# Patient Record
Sex: MALE | Race: WHITE | Employment: OTHER | URBAN - METROPOLITAN AREA
[De-identification: names, ages, dates, MRNs, and addresses within clinical notes are randomized per-mention and may not be internally consistent; named-entity substitution may affect disease eponyms.]

---

## 2019-06-24 ENCOUNTER — TRANSFERRED RECORDS (OUTPATIENT)
Dept: HEALTH INFORMATION MANAGEMENT | Facility: CLINIC | Age: 74
End: 2019-06-24

## 2019-07-25 ENCOUNTER — TRANSFERRED RECORDS (OUTPATIENT)
Dept: HEALTH INFORMATION MANAGEMENT | Facility: CLINIC | Age: 74
End: 2019-07-25

## 2019-11-26 NOTE — TELEPHONE ENCOUNTER
ONCOLOGY INTAKE: Records Information      APPT INFORMATION:  Referring provider: NANCY DENNISON MD  Referring provider s clinic:  [facility]  Reason for visit/diagnosis: History of gastric cancer. Abdominal wall pain. Irregular bowel habbit and pancreatic cyst   Has patient been notified of appointment date and time?: Yes    RECORDS INFORMATION:  Were the records received with the referral (via [employer])? No    Has patient been seen for any external appt for this diagnosis? Yes    If yes, where? [facility]    Has patient had any imaging or procedures outside of [place] for this condition? Yes      If Yes, where? [facility]    ADDITIONAL INFORMATION:  # [phone] for [facility] records team to release info.

## 2019-11-27 NOTE — TELEPHONE ENCOUNTER
RECORDS STATUS - ALL OTHER DIAGNOSIS      RECORDS RECEIVED FROM: [facility]   DATE RECEIVED:    NOTES STATUS DETAILS   OFFICE NOTE from referring provider [facility] - Most recent OV 10/9/19: Dimitris   OFFICE NOTE from medical oncologist  8/7/19: Vegunta   DISCHARGE SUMMARY from hospital CE 3/25/19,5/31/17   DISCHARGE REPORT from the ER CE 9/4/17, 10/13/16, 5/31/14   OPERATIVE REPORT CE EGD: 7/25/19, 7/18/17, 2/2/17, 1/23/17    Laparoscopy: 8/29/17, 5/31/17    Peritoneal Washings: 2/15/17   MEDICATION LIST [facility]   CLINICAL TRIAL TREATMENTS TO DATE     LABS     PATHOLOGY REPORTS [facility] 7/25/19, 3/25/19, 11/13/18, 7/27/18, 2/14/18, 7/18/17, 5/31/17, 2/2/17, 1/23/17   ANYTHING RELATED TO DIAGNOSIS     GENONOMIC TESTING     TYPE:     IMAGING (NEED IMAGES & REPORT) Requested imaging detailed in Records Action below    CT SCANS     MRI     MAMMO     ULTRASOUND     PET       Action    Action Taken 11/27/19:    Images from [facility] requested (All reports in CE):    XR Upper GI With SM Bowel: 6/24/19  CT Abdomen Pelvis With Contrast: 6/7/19  XR Chest PA and Lateral: 3/27/19  MRI Abdomen With and Without Contrast: 1/30/19  CT Chest With Contrast: 1/25/19  CT Abdomen Pelvis Without and With Contrast: 8/7/18  CT Chest With Contrast: 7/30/18  CT Abdomen Pelvis Without and With Contrast: 3/15/18  MRI Abdomen With and Without Contrast: 2/8/18  CT Abdomen Pelvis Without and With Contrast: 1/22/18  CT Chest with Contrast: 10/29/19  XR Upper GI: 10/3/17  XR Long GI Feeding Tube Placement: 8/18/17  XR Upper GI With SM Bowel: 7/20/17  CT Abdomen Pelvis: 5/11/17  XR Chest Portable: 2/16/17  MRI Abdomen Without and With Contrast: 11/6/16  MRI Pelvis Without and With Contrast: 11/2/16  CT Chest Without Contrast: 10/26/16  HIDA: 10/18/16  CT Abdomen Pelvis With Contrast: 10/14/16  US Gallbladder RUQ: 10/14/16  XRay Video Swallow With Fluro: 6/17/16  XR Chest PA And Lateral: 5/25/16

## 2019-12-18 ENCOUNTER — OFFICE VISIT (OUTPATIENT)
Dept: GASTROENTEROLOGY | Facility: CLINIC | Age: 74
End: 2019-12-18
Payer: MEDICARE

## 2019-12-18 ENCOUNTER — TELEPHONE (OUTPATIENT)
Dept: GASTROENTEROLOGY | Facility: CLINIC | Age: 74
End: 2019-12-18

## 2019-12-18 VITALS
DIASTOLIC BLOOD PRESSURE: 66 MMHG | WEIGHT: 160.2 LBS | TEMPERATURE: 97.2 F | SYSTOLIC BLOOD PRESSURE: 125 MMHG | OXYGEN SATURATION: 96 % | RESPIRATION RATE: 20 BRPM | HEART RATE: 82 BPM

## 2019-12-18 DIAGNOSIS — R10.13 ABDOMINAL PAIN, EPIGASTRIC: Primary | ICD-10-CM

## 2019-12-18 DIAGNOSIS — R68.81 EARLY SATIETY: Primary | ICD-10-CM

## 2019-12-18 DIAGNOSIS — R10.13 ABDOMINAL PAIN, EPIGASTRIC: ICD-10-CM

## 2019-12-18 DIAGNOSIS — R68.81 EARLY SATIETY: ICD-10-CM

## 2019-12-18 PROCEDURE — 99204 OFFICE O/P NEW MOD 45 MIN: CPT | Performed by: INTERNAL MEDICINE

## 2019-12-18 RX ORDER — TRIAMCINOLONE ACETONIDE 1 MG/G
CREAM TOPICAL
COMMUNITY
Start: 2017-12-13

## 2019-12-18 RX ORDER — SUCRALFATE ORAL 1 G/10ML
1 SUSPENSION ORAL 4 TIMES DAILY
Qty: 300 ML | Refills: 3 | Status: SHIPPED | OUTPATIENT
Start: 2019-12-18

## 2019-12-18 RX ORDER — TAMSULOSIN HYDROCHLORIDE 0.4 MG/1
0.4 CAPSULE ORAL DAILY
COMMUNITY

## 2019-12-18 RX ORDER — FINASTERIDE 5 MG/1
5 TABLET, FILM COATED ORAL DAILY
COMMUNITY
Start: 2018-10-19

## 2019-12-18 RX ORDER — SUCRALFATE 1 G/1
1 TABLET ORAL 4 TIMES DAILY
Qty: 120 TABLET | Refills: 3 | Status: SHIPPED | OUTPATIENT
Start: 2019-12-18

## 2019-12-18 ASSESSMENT — PAIN SCALES - GENERAL: PAINLEVEL: MILD PAIN (2)

## 2019-12-18 NOTE — NURSING NOTE
Luciano Mcdonnell's goals for this visit include:   Chief Complaint   Patient presents with     RECHECK     History of gastric cancer       He requests these members of his care team be copied on today's visit information: Yes    PCP: Jason Gama    Referring Provider:  No referring provider defined for this encounter.    /66 (BP Location: Left arm, Patient Position: Sitting, Cuff Size: Adult Large)   Pulse 82   Temp 97.2  F (36.2  C) (Oral)   Resp 20   Wt 72.7 kg (160 lb 3.2 oz)   SpO2 96%     Do you need any medication refills at today's visit? Deya Daniel CMA

## 2019-12-18 NOTE — TELEPHONE ENCOUNTER
Spoke to pharmacy, carafate tablets are covered. Orders placed.    Brigid Salcido RN  Gastroenterology Care Coordinator  [place], [place]

## 2019-12-18 NOTE — TELEPHONE ENCOUNTER
Health Call Center    Phone Message    May a detailed message be left on voicemail: yes    Reason for Call: Medication Question or concern regarding medication   Prescription Clarification  Name of Medication: sucralfate (CARAFATE) 1 GM/10ML suspension  Prescribing Provider: Dr. Hutchison   Pharmacy: [facility]    What on the order needs clarification? The Rx is not covered by his insurance and they are wondering if they can use the tablets instead since it will be covered and cheaper for the patient. Please advise. Thank you.          Action Taken: Message routed to:  Adult Clinics: Gastroenterology (GI) p [id number]

## 2019-12-18 NOTE — PROGRESS NOTES
"GI CLINIC VISIT    CC/REFERRING MD:  No ref. provider found  REASON FOR CONSULTATION:   No ref. provider found for   Chief Complaint   Patient presents with     RECHECK     History of gastric cancer         HPI    Patient here today for a second opinion regarding abdominal pain. Has a history of gastric cancer that was treated with a bilroth II resection after receiving neoadjuvant chemotherapy.   Initially he had a lot of symptoms of nausea, early satiety, etc but those slowly resolved over time and he was doing relatively well until he had to have a L nephrectomy for a renal mass in March.  Since March he has had a few issues.  One is a sharp pain that occurs just next to his surgical scar.  Pain happens when anything touches the skin and he has been wearing looser clothes to combat this.  Was seen by pain clinic and had a local block done which helped a little but his symptoms did not resolve completely.  No association with eating or bowel movements.    His other issue is nausea that occurs about 30 minutes after eating.  Worse in the morning and interestingly is much worse when he exerts himself.  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  He will then go eat lunch and often lay down after so his symptoms improve.  He then generally eats dinner without issue and then he eats \"as much as possible\" after dinner until he goes to bed or else he will wake up in the middle of the night hungry.  Tolerates liquids without any problems.  No dysphagia.  Does notice that his symptoms are much worse with fatty foods but admits its often hard to avoid them. Italian foods are easy for him to tolerate like pasta with red sauce.  No reflux.  Has been following with GI in [place] regarding his symptoms - had an upper GI series which was unremarkable.  EGD with mild inflammation at the anastomosis but otherwise normal.  Tried a PPI but it gave him diarrhea.    He alternates " between loose and formed stools.  Was having constipation at one point but that seems to be better now that he eats cashews at night.    Says that his symptoms are bothersome but manageable although he is hoping that he will be able to get some relief.      ROS:     No fevers or chills  No weight loss  No blurry vision, double vision or change in vision  No sore throat  No lymphadenopathy  No headache, paraesthesias, or weakness in a limb  No shortness of breath or wheezing  No chest pain or pressure  No arthralgias or myalgias  No rashes or skin changes  No odynophagia or dysphagia  No BRBPR, hematochezia, melena  No dysuria, frequency or urgency  No hot/cold intolerance or polyria  No anxiety or depression    PROBLEM LIST  History of nephrectomy  History of gastric adenoCA  Nausea  Abdominal pain  DM     PREVIOUS SURGERIES:  Bilroth II  nephrectomy    PREVIOUS ENDOSCOPY:  As above    ALLERGIES:   No Known Allergies    PERTINENT MEDICATIONS:    Current Outpatient Medications:      finasteride (PROSCAR) 5 MG tablet, Take 5 mg by mouth, Disp: , Rfl:      multivitamin CF FORMULA (CHOICEFUL) softgel, Take 1 tablet by mouth daily, Disp: , Rfl:      tamsulosin (FLOMAX) 0.4 MG capsule, Take 0.4 mg by mouth, Disp: , Rfl:      triamcinolone (KENALOG) 0.1 % external cream, Apply to affected area 3 times a day as needed for itching, Disp: , Rfl:      UNABLE TO FIND, MEDICATION NAME: Simethicone 0.4mg capsule pt states taking this OTC every day, Disp: , Rfl:     SOCIAL HISTORY:  Social History     Socioeconomic History     Marital status:      Spouse name: Not on file     Number of children: Not on file     Years of education: Not on file     Highest education level: Not on file   Occupational History     Not on file   Social Needs     Financial resource strain: Not on file     Food insecurity:     Worry: Not on file     Inability: Not on file     Transportation needs:     Medical: Not on file     Non-medical: Not on  file   Tobacco Use     Smoking status: Never Smoker     Smokeless tobacco: Never Used   Substance and Sexual Activity     Alcohol use: Not on file     Drug use: Not on file     Sexual activity: Not on file   Lifestyle     Physical activity:     Days per week: Not on file     Minutes per session: Not on file     Stress: Not on file   Relationships     Social connections:     Talks on phone: Not on file     Gets together: Not on file     Attends [employer] service: Not on file     Active member of club or organization: Not on file     Attends meetings of clubs or organizations: Not on file     Relationship status: Not on file     Intimate partner violence:     Fear of current or ex partner: Not on file     Emotionally abused: Not on file     Physically abused: Not on file     Forced sexual activity: Not on file   Other Topics Concern     Not on file   Social History Narrative     Not on file       FAMILY HISTORY:  Father with heart disease  Mother with unknown cancer  Past/family/social history reviewed and no changes    PHYSICAL EXAMINATION:  Constitutional: aaox3, cooperative, pleasant, not dyspneic/diaphoretic, no acute distress  Vitals reviewed: /66 (BP Location: Left arm, Patient Position: Sitting, Cuff Size: Adult Large)   Pulse 82   Temp 97.2  F (36.2  C) (Oral)   Resp 20   Wt 72.7 kg (160 lb 3.2 oz)   SpO2 96%   Wt:   Wt Readings from Last 2 Encounters:   12/18/19 72.7 kg (160 lb 3.2 oz)      Eyes: Sclera anicteric/injected  Ears/nose/mouth/throat: Normal oropharynx without ulcers or exudate, mucus membranes moist, hearing intact  Neck: supple, thyroid normal size  CV: No edema  Respiratory: Unlabored breathing  Lymph: No axillary, submandibular, supraclavicular or inguinal lymphadenopathy  Abd: Soft, Nondistended, +bs, no hepatosplenomegaly,, no peritoneal signs.  Surgical scars noted.  Exquisitely tender to palpation adjacent to LLQ scar  Skin: warm, perfused, no jaundice  Psych: Normal  affect  MSK: Normal gait      PERTINENT STUDIES:  Most recent CBC:  No lab results found.  Most recent hepatic panel:  No lab results found.    Invalid input(s): WILLIAM, ALP  Most recent creatinine:  No lab results found.      ASSESSMENT/PLAN:     1. Post prandial nausea, bloating - interestingly, this is worse with activity.  Reviewed previous w/u - no evidence of anastomotic stricture. Could be bile acid gastritis.  Other etiologies include altered gastric motility 2/2 his previous surgeries.  Will get gastric emptying scan.  Can try carafate - if no improvement can consider trial of ursodiol. In the interim - dietary changes like eating small frequent meals throughout the day maybe beneficial - may also be useful to see a nutritionist at some point.    2. LLQ abdominal pain - likely neuropathic component - some improvement with injection - maybe useful to repeat.  Can also consider adding a neuromodulator such as amitriptyline.    RTC 6 months - patient has contact information for office so we can make changes to his regimen by phone if needed as he lives in [place]    Thank you for this consultation.  It was a pleasure to participate in the care of this patient; please contact us with any further questions.  A total of 30 minutes, face to face, was spent with this patient, >50% of which was counseling regarding the above delineated issues.    This note was created with voice recognition software, and while reviewed for accuracy, typos may remain.

## 2019-12-18 NOTE — PATIENT INSTRUCTIONS
Please have a gastric emptying scan done - let me know the day after you have it so I can look for it in the chart.  Start taking carafate (sucralfate) three times a day with meals - this needs to be  from other medications by at least an hour.  You may benefit from a medication that works on neuropathic pain like amitriptyline.     Try to eat smaller meals more frequently throughout the day.  Continue to avoid foods that make your symptoms worse like high fiber things and fattier foods.      Please call us with an update after you start carafate - you can reach our nurse Brigid directly at [phone] - this is also who you should call when your gastric emptying study is completed.

## 2019-12-20 ENCOUNTER — PRE VISIT (OUTPATIENT)
Dept: SURGERY | Facility: CLINIC | Age: 74
End: 2019-12-20

## 2019-12-27 ENCOUNTER — TRANSFERRED RECORDS (OUTPATIENT)
Dept: HEALTH INFORMATION MANAGEMENT | Facility: CLINIC | Age: 74
End: 2019-12-27

## 2020-01-08 ENCOUNTER — TELEPHONE (OUTPATIENT)
Dept: GASTROENTEROLOGY | Facility: CLINIC | Age: 75
End: 2020-01-08

## 2020-01-08 NOTE — TELEPHONE ENCOUNTER
Per Dr. Ortiz, pt has dumping syndrome.   Advice as follows: Pt needs to eat small frequent meals, high protein, not eat liquids with solids, not a lot of simple sugars, and have high fiber foods. See Dietician. How would patient like this arranged?    Brigid Salcido, RN  Gastroenterology Care Coordinator  [place], [place]

## 2020-01-08 NOTE — TELEPHONE ENCOUNTER
Informed pt that we received gastric emptying scan results and gave them to Dr. Ortiz.    Brigid Salcido, RN  Gastroenterology Care Coordinator  [place], [place]

## 2020-01-08 NOTE — TELEPHONE ENCOUNTER
Requested pt for records be faxed.    Brigid Salcido RN  Gastroenterology Care Coordinator  [place], [place]

## 2020-01-08 NOTE — TELEPHONE ENCOUNTER
Pt had gastric emptying scan completed at [facility] and is asking if we have received results.    Brigid Salcido RN  Gastroenterology Care Coordinator  [place], [place]

## 2020-01-10 NOTE — TELEPHONE ENCOUNTER
Relayed result notes and recommendations to patient per Dr. Hutchison. Assisted in setting appointment up with Nutrition at then end of March. Patient was agreeable and had no further questions.    Clary Burgos LPN

## 2020-06-19 ENCOUNTER — VIRTUAL VISIT (OUTPATIENT)
Dept: GASTROENTEROLOGY | Facility: CLINIC | Age: 75
End: 2020-06-19
Payer: MEDICARE

## 2020-06-19 DIAGNOSIS — K21.9 GASTROESOPHAGEAL REFLUX DISEASE, ESOPHAGITIS PRESENCE NOT SPECIFIED: Primary | ICD-10-CM

## 2020-06-19 PROCEDURE — 99442 ZZC PHYSICIAN TELEPHONE EVALUATION 11-20 MIN: CPT | Performed by: INTERNAL MEDICINE

## 2020-06-19 NOTE — PROGRESS NOTES
"Luciano Mcdonnell is a 74 year old male who is being evaluated via a billable telephone visit.      The patient has been notified of following:     \"This telephone visit will be conducted via a call between you and your physician/provider. We have found that certain health care needs can be provided without the need for a physical exam.  This service lets us provide the care you need with a short phone conversation.  If a prescription is necessary we can send it directly to your pharmacy.  If lab work is needed we can place an order for that and you can then stop by our lab to have the test done at a later time.    Telephone visits are billed at different rates depending on your insurance coverage. During this emergency period, for some insurers they may be billed the same as an in-person visit.  Please reach out to your insurance provider with any questions.    If during the course of the call the physician/provider feels a telephone visit is not appropriate, you will not be charged for this service.\"    Patient has given verbal consent for Telephone visit?  Yes    What phone number would you like to be contacted at? [phone]    How would you like to obtain your AVS? Mail a copy         Follow-up today of abdominal pain and dumping syndrome.  Abdominal pain has resolved.  Continues to have issues with not being able to eat and then exert himself - causes him to feel lousy and get a side ache.  Gastric emptying scan was notable for dumping syndrome.  Did advise patient to eat high protein/fiber meals and snacks and avoid eating and drinking at the same time.  Patient admits he hasn't tried this, although doesn't think it will help as sometimes even eating eggs will cause him to have symptoms.    Hasn't been able to see the nutritionist due to covid - planning to see them in a few weeks.     Is complaining of an intermittent burning sensation in his chest - resolves with drinking water and tums but sometimes can " last a few days.  Says this has been going on for some time, although, he had forgotten about it as he has been having the other symptoms but now that they are better he has been noticing it more.  PROBLEM LIST  History of nephrectomy  History of gastric adenoCA  Nausea  Abdominal pain  DM      PREVIOUS SURGERIES:  Bilroth II  nephrectomy    Social History     Tobacco Use     Smoking status: Never Smoker     Smokeless tobacco: Never Used   Substance Use Topics     Alcohol use: Not on file     Assessment and Plan:    # dumping syndrome - discussed with patient and his wife at length - also discussed that treatment of this is primarily dietary. Again discussed eating small frequent meals and snacks throughout the day that are low in sugar and high in fiber/protein.  Patient voiced understanding and is scheduled to see nutritionist soon    # heartburn symptoms - will try prevacid daily.  Recent EGD (when patient reports he was still having these symptoms) unremarkable.    Phone call duration: 14 minutes    Marichuy Hutchison DO

## 2020-06-19 NOTE — PATIENT INSTRUCTIONS
Please see the nutritionist.    Start taking lansoprazole (prevacid) daily - take on an empty stomach and eat 30-60 minutes later.

## 2020-06-24 ENCOUNTER — TELEPHONE (OUTPATIENT)
Dept: GASTROENTEROLOGY | Facility: CLINIC | Age: 75
End: 2020-06-24

## 2020-06-24 DIAGNOSIS — K21.9 GASTROESOPHAGEAL REFLUX DISEASE, ESOPHAGITIS PRESENCE NOT SPECIFIED: Primary | ICD-10-CM

## 2020-06-24 RX ORDER — MECOBALAMIN 5000 MCG
15 TABLET,DISINTEGRATING ORAL DAILY
Qty: 30 CAPSULE | Refills: 3 | Status: SHIPPED | OUTPATIENT
Start: 2020-06-24

## 2020-06-24 NOTE — TELEPHONE ENCOUNTER
Pharmacy called and stated the requested a prescription yesterday and haven't heard back.    Original Rx that was sent and not covered by insurance:  LANsoprazole (PREVACID SOLUTAB) 15 MG ODT    Requesting new RX: Lansoprazole 15 MG Oral Capsules

## 2020-06-30 ENCOUNTER — VIRTUAL VISIT (OUTPATIENT)
Dept: NUTRITION | Facility: CLINIC | Age: 75
End: 2020-06-30
Payer: MEDICARE

## 2020-06-30 DIAGNOSIS — R68.81 EARLY SATIETY: Primary | ICD-10-CM

## 2020-06-30 PROCEDURE — 97802 MEDICAL NUTRITION INDIV IN: CPT | Mod: 95 | Performed by: DIETITIAN, REGISTERED

## 2020-06-30 NOTE — PROGRESS NOTES
"Luciano Mcdonnell is a 74 year old male who is being evaluated via a billable telephone visit.      The patient has been notified of following:     \"This telephone visit will be conducted via a call between you and your physician/provider. We have found that certain health care needs can be provided without the need for a physical exam.  This service lets us provide the care you need with a short phone conversation.  If a prescription is necessary we can send it directly to your pharmacy.  If lab work is needed we can place an order for that and you can then stop by our lab to have the test done at a later time.    Telephone visits are billed at different rates depending on your insurance coverage. During this emergency period, for some insurers they may be billed the same as an in-person visit.  Please reach out to your insurance provider with any questions.    If during the course of the call the physician/provider feels a telephone visit is not appropriate, you will not be charged for this service.\"    Patient has given verbal consent for Telephone visit?  Yes, wife jian    What phone number would you like to be contacted at? [phone]  How would you like to obtain your AVS? 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   He is accompanied by spouse.     NUTRITION ASSESSMENT:   Patient comments/concerns relating to nutrition: Initial GI consult with Diana - Note forwarded from 12/18/2019.    Patient here today  regarding abdominal pain. Has a history of gastric cancer that was treated with a bilroth II resection after receiving neoadjuvant chemotherapy.   Initially he had a lot of symptoms of nausea, early " "satiety, etc but those slowly resolved over time and he was doing relatively well until he had to have a L nephrectomy for a renal mass in March.  Since March he has had a few issues.  One is a sharp pain that occurs just next to his surgical scar.  Pain happens when anything touches the skin and he has been wearing looser clothes to combat this.  Was seen by pain clinic and had a local block done which helped a little but his symptoms did not resolve completely.  No association with eating or bowel movements.     His other issue is nausea that occurs about 30 minutes after eating.  Worse in the morning and interestingly is much worse when he exerts himself.  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  He will then go eat lunch and often lay down after so his symptoms improve.  He then generally eats dinner without issue and then he eats \"as much as possible\" after dinner until he goes to bed or else he will wake up in the middle of the night hungry.  Tolerates liquids without any problems.  No dysphagia.  Does notice that his symptoms are much worse with fatty foods but admits its often hard to avoid them. Italian foods are easy for him to tolerate like pasta with red sauce.  No reflux.  Has been following with GI in [place] regarding his symptoms - had an upper GI series which was unremarkable.  EGD with mild inflammation at the anastomosis but otherwise normal.  Tried a PPI but it gave him diarrhea.     He alternates between loose and formed stools.  Was having constipation at one point but that seems to be better now that he eats cashews at night.     Says that his symptoms are bothersome but manageable although he is hoping that he will be able to get some relief.     Per follow up with [facility] note on 6/19/2020  Follow-up today of abdominal pain and dumping syndrome.  Abdominal pain has resolved.  Continues to have issues with not being able to eat and " then exert himself - causes him to feel lousy and get a side ache.  Gastric emptying scan was notable for dumping syndrome.  Did advise patient to eat high protein/fiber meals and snacks and avoid eating and drinking at the same time.  Patient admits he hasn't tried this, although doesn't think it will help as sometimes even eating eggs will cause him to have symptoms.     Hasn't been able to see the nutritionist due to covid - planning to see them in a few weeks.      Is complaining of an intermittent burning sensation in his chest - resolves with drinking water and tums but sometimes can last a few days.  Says this has been going on for some time, although, he had forgotten about it as he has been having the other symptoms but now that they are better he has been noticing it more.      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 Can't have a lot       Skips meals/snacks: Yes      Beverages: Yes milk 1/4 cup or juice - food gets stuck  Can't eat watermelon  Beer on occasion and seems to not cause him issus that he knows of    Waits to eat for half hour to hour sometimes needs to wait 2 hours     Barriers around meals/snacks include:GI issues      Diet is high in: refined carbs and only one big meal at a day  Diet is low in: fat  (unsaturated), fiber, fruits, protein, vegetables and consistent small meals daily   No family history on file.       Pertinent Past Medical History:   I have reviewed this patient's medical history and updated it with pertinent information if needed.   No past medical history on file.      Previous Surgeries:   I have reviewed this patient's surgical history and updated it with pertinent information if needed.  No past surgical history on file.    Physical Findings:   Digestive System:stomach or abdominal pains, bloating , heartburn/acid reflux or indigestion  and nausea     Normal Bowl Movements: No    # of Bowl movements: 1x per day   Irregular Bowl Movements: hard to pass and formed and soft     Medications and Supplements:  Current Outpatient Medications   Medication     finasteride (PROSCAR) 5 MG tablet     LANsoprazole (PREVACID) 15 MG DR capsule     multivitamin CF FORMULA (CHOICEFUL) softgel     sucralfate (CARAFATE) 1 GM tablet     sucralfate (CARAFATE) 1 GM/10ML suspension     tamsulosin (FLOMAX) 0.4 MG capsule     triamcinolone (KENALOG) 0.1 % external cream     UNABLE TO FIND     No current facility-administered medications for this visit.        LABS:  Last Basic Metabolic Panel:  No results found for: NA   No results found for: POTASSIUM  No results found for: CHLORIDE  No results found for: MALGORZATA  No results found for: CO2  No results found for: BUN  No results found for: CR  No results found for: GLC    Last Glucose Profile:   No results found for: A1C    Last Lipid Profile:   No results found for: CHOL  No results found for: HDL  No results found for: LDL  No results found for: TRIG  No results found for: CHOLHDLRATIO    Most recent CBC:  No lab results found.  Most recent hepatic panel:  No lab results found.    Invalid input(s): WILLIAM, ALP  Most recent creatinine:  No lab results found.    Last Thyroid Profile:   No results found for: TSH    Last Mineral Profile:   No results found for: JOHANNA, IRON,  "FEB      Last Vitamin Profile:   No results found for: BHG391, SPAI134, [id number], VITD3, D2VIT, D3VIT, DTOT, [id number], [id number], [id number], [id number], [id number], [id number]    ANTHROPOMETRICS:  Vitals:   BP Readings from Last 1 Encounters:   12/18/19 125/66     Pulse Readings from Last 1 Encounters:   12/18/19 82     There is no height or weight on file to calculate BMI.    Wt Readings from Last 5 Encounters:   12/18/19 72.7 kg (160 lb 3.2 oz)         NUTRITION DIAGNOSIS:   1. Altered GI function related to skipping meals, eating one big meal and higher intake of refined carbs as evidenced by food recall, stomach pain, bloating. (high fasting glucose and low hemoglobin)         NUTRITION INTERVENTION:      Long Term Goals:   Goal: BM Daily Dahlgren Stool type 4 and decrease digestive issues    Goal: Gain 10lbs in the next 1-3 months and stop losing weight     Short Term Goals:   Goal 1:   Start smoothie for breakfast daily - see recipes - try unsweetened almond milk instead of cows milk, pick plant based protein powder as discussed below        Goal 2:  Try the probiotic BIOHM - see more info under probiotics below     Goal 3: At follow up will consider a digestive enzyme if dietary and probiotic have not significantly helped with symptoms.       Nutrition for Dumping Syndrome  Dumping syndrome is a condition that occurs when food is emptied or \"dumped\" from  the stomach into the small intestine too quickly.     There are two phases for symptoms of dumping syndrome: early dumping and late  dumping:    Early dumping may occur 10-30 minutes right after a meal, symptoms may  include nausea, vomiting, diarrhea, gas, bloating and cramping.    Late dumping may occur 1-3 hours after a meal, symptoms may include  hypoglycemia (low blood sugars), dizziness, lightheadedness and sweating.  Making changes in the diet can help slow gastric emptying and minimize symptoms.  General Guidelines    Eat 5-6 small frequent " meals throughout the day. Eat slowly and chew food well.    People with dumping syndrome should reduce the amount of food consumed at each meal in order to minimize symptoms. That means eating about 5 to 6 smaller meals throughout the day instead of eating three larger meals. In addition to eating smaller meals, dumping syndrome patients should eat slowly, chew their food well and delay drinking fluids until at least 30 minutes after a meal. Even though you should wait until after a meal is finished to drink water, make sure you do it so that you don t become dehydrated. Some people also find it helpful to lie down after eating, which may help to relieve symptoms.      Avoid drinking liquids with meals. Drink liquids 30 minutes before a meal or drink  30 minutes after a meal.      Include small amounts of extra fats into the diet such as avocado, nuts and nut  butters.    Include protein with each meal and snack you eat. (High protein foods include  meats, poultry, fish, eggs, tofu and nut butters- monitor to see if sensitive to trigger protein foods taken out of elimination diet such as eggs, beef, pork and dairy)    Rest or lie down for 30 minutes after a meal       Avoid inflammatory & high sugar foods. Dairy tops the list. The proteins like casein and whey in dairy can irritate and inflame your gut, while gluten the protein in wheat, rye and barley is a close second. Give those foods up for at least 3-6 months and see if digestion, blood sugars, weight and overall health improve.    The lactose in dairy products may worsen dumping syndrome symptoms, so limiting dairy consumption may be helpful. If you eat dairy products, start by limiting these foods to see if symptoms improve. If you think that dairy may be causing symptoms to worsen, eliminate dairy foods completely. For people with lactose intolerance, goat milk may be easier to digest because it contains lower concentrations of lactose.    Continue to  eliminate gluten, dairy, soy, corn, processed refined grains, sugar, alcohol and caffeine  Monitor FODMAPS and if nightshades contribute to your symptoms. Top common nightshades are white potatoes, tomatoes, egg plant, bell peppers and hot peppers. Also, monitor foods high in histamine and fermented foods to see if trigger symptoms - see Elimination Food Plan     Eat real anti-inflammatory food. When you eat whole, real foods in their unprocessed forms, you take the first step to healing our gut and overall health. Eat plenty of vegetables, fruits, non-gluten whole grains (monitor for symptoms), nuts, seeds, and other plant foods.     Start following reintroduction phase (see guide for details) of elimination diet to see if certain foods trigger symptoms. If you are avoiding FODMAPS focus on keeping out wheat and gluten and then add in cashew butter or nuts to see if this high fodmap food gives you issues. The same can be done for nightshades. Tracking your foods and symptoms can be beneficial in helping you identify patterns while becoming more aware of what you eat.       Food plan - 1,800-2,200 calories per day     Protein 10-12 servings per day - include at each meal to stabilize blood sugars   (Choose 3oz or 21g per meal and aim for 1oz of 7g for snacks)   Strive for 1-2 servings of fish per week especially of higher omega-3 fatty acid containing fish such as salmon.     Legumes <2 serving per day     Dairy alternatives 2-3 serving per day     Nuts and seeds 3-4 servings per day - great to incorporate as snacks    Fats and oils 4 servings per day     Non starchy vegetables 8-10 servings per day     Starchy vegetable limit 1 serving per day as they tend to impact blood sugar (they are moderate-GI).     Fruits 2 servings per day - best to couple with a little bit of protein or fat to offset a rise in blood sugars (they are low-moderate-GI foods).     Whole grains <2 serving per day - try gluten free whole grains  instead    Eat High-Fiber and Protein-Rich Foods    Eating complex carbohydrates that are high in fiber can help people with dumping syndrome. This is because they resist immediate breakdown and convert into sugar over time, as opposed to refined carbohydrates that have had their structure altered and enter the bloodstream like an injection of sugar. Complex carbohydrate foods that you can be consume regularly include root vegetables (like sweet potatoes, carrots, parsnips and beets), legumes, sprouted grain bread, and ancient grains like barley and quinoa. Try focusing on gluten free grains for at least 3 weeks to see if this helps with symptoms.     Eating protein foods is also important because they require more work for the body to digest than fast-acting refined carbohydrates. Eating clean, healthy sources of protein will also help to stabilize your blood sugar levels. They also provide helpful digestive enzymes and immune-boosting antibodies. The best sources of protein are grass-fed beef, lentils, wild fish, organic chicken, free-range eggs and protein powder made from bone broth.    Incorporate protein powder daily:    Plant based hemp (recommended brands: "[employer] Just Hemp Protein, [employer])      Plant based pea (recommended brands: Naked Pea, [employer]). If you want to try a combo of pea and hemp the brand Nelson in vanilla or chocolate is a great option.     Try Bone broth protein powder or collagen peptides in liquid bone broth, vegetable broth or 12 oz of water as snack. The bone broth powder and collagen can be used for soups as well. This can help provide essential amino acids and minerals that heal your gut as well as balance blood sugars. A great option if you have a hard time tolerating solids.         Eat Fresh Fruits and Vegetables    Eat plenty of fresh fruits and vegetables in order to ensure that you re consuming (and absorbing) enough nutrients throughout the day.  Colorful fruits like apples, pears, megan, guava, pineapple and berries, and cruciferous vegetables (like arugula, bok cady, broccoli, kale and kristi greens) are some of the best choices. If you choose to make a healthy smoothie with fruits and vegetables instead of eating them whole with your meals, wait an hour after eating. This allows your body to properly digest the solid foods first.      Choose foods high in fiber: Aim for at least 5 grams of fiber per serving of food or a total of 25-35 grams fiber per day. Remember, when looking at the label, you can take the fiber away from the total carbs. Ex:15g of total carbs - 4g of fiber = 11g net carbs     Insoluble fiber acts like a bulky  inner broom,  sweeping out debris from the intestine and creating more motility and movement.      Soluble fiber attracts water and swells, creating a gel that slows digestion.  Also, slows the release of glucose from foods into the blood which stops spikes in blood sugar levels.  Soluble fiber traps toxins in the gut, helping to carry them to excretion and provides healthy bacteria in the digestive tract.       Spread out fiber intake throughout the day. (Fiber is a component of complex carbohydrates that the body cannot digest, is found in plant based foods such as beans, lentils,  fruits, vegetables and whole grains)      Limit Simple Carbohydrates    It s important for people with dumping syndrome to eliminate simple, rapidly absorbable carbohydrates from their diet in order to prevent late dumping symptoms like hypoglycemia. Refined carbohydrates are forms of sugars and starches that don t actually exist in nature. This means they don t come from natural, whole foods. Instead they are processed, concentrated and purified in order to change the food s taste, texture and shelf life.    Avoid eating baked goods, refined grains, wheat products, fruit juices, soda and foods made with table sugar. It s also important to avoid  eating packaged and processed foods that contain simple carbohydrates, added sugars and chemical additives that can disrupt the GI tract even further and make dumping symptoms worse.    Avoid foods containing refined sugars, artificial sweeteners, and refined grains they are considered high-GI because they lead to sharp increases in blood sugars levels, which increase insulin sensitivity causing increased TG, and low good cholesterol (HDL).   Ex: cakes, cookies, pies, bread, sodas, fruit drinks, presweetened tea, coffee drinks, energy or sport drinks, flavored milk and other processed foods.     Choose High Quality Fats: Adding anti-inflammatory fats into your diet such as fish (salmon, herring, mackerel, and sardines), omega 3 eggs, leidy seeds, ground flax seeds/milk, hemp seeds/milk, walnuts and some other certain leafy greens will increase omega-3 fats to omeaga-6 fats ratio.     Therapeutic fats both monounsaturated and polyunsaturated to include daily: ground flaxseeds, unsalted mixed nuts, avocados, olives, extra-virgin olive oil.     Emphasize high-quality oils and fats in the diet daily such as avocado oil, coconut oil, flaxseed, olive, sesame. Ex: 1 tsp to 1 tbsp of MCT oil from coconuts can be added into coffee, smoothies, and salad dressings per day.     Avoid trans fats found in processed foods     Drink more water. Hydration is critical, so drink at least six to eight glasses of water a day. Drink more water between meals and less at meals.     Try adding herbal teas (sugar free) or lemon/lime/cucumber/fruit to water for flavor. Avoid artificial sweetener packets to flavor your water.     Cut back on coffee switch to green tea. Avoid adding sugar and milk to coffee instead use dairy alternatives such as almond, flax, coconut milk.     REPOPULATE  Recolonization with healthy, beneficial bacteria (probiotics) and reintroduce prebiotics from foods/supplements as tolerated that will help rebalance the  microbiome.     Probiotics: Consume foods rich in beneficial bacteria such as coconut kefir, coconut yogurt, sauerkraut, kimchi, kombucha or [employer].       Prebiotics: Incorporate carbohydrates that bacteria love to eat such as inulin from chicory, onions, garlic, leeks, artichokes, dandelion leaves, zack gum, asparagus, apples, leidy/flax seeds, psyllium, beans and resistant starches (seeds, cashews, legumes, plantains, green bananas and potatoes that are cooked and cooled.    **avoid any foods that cause adverse reactions and not tolerated when you reintroduce. Introduce slowly small amounts of gas and bloating is normal and should pass.       Rebuild your friendly bacteria in your microbime. Start taking probiotic supplements. They will help rebuild the healthy bacteria so essential to good gut health.     Recommend trying BIOHM probiotic.  Take 1 capsule daily anytime with our without food. Does not need to be refrigerated. Get at www.[employer]. Try take at least for one month. This isn t something you have to take long term for years and years. Just getting some good bacteria in and then eating the right foods feeds them so they can multiply.     Track what you eat. Writing down or tracking through an ke what you eat as well as how you feel and help you identify patterns in your symptoms. This can help you become more aware and create a diet that is right for you. You can track symptoms, bowl movements, medications, stress, exercise, sleep and foods as well as beverages to become more mindful.     ** Make list of foods that cause symptoms to provide and review at follow up.     Increase physical activity. Moving our body helps move our bowels and speeds up your metabolism.     Exercise 15-60 minutes daily--whether that looks like burst training, yoga, or vigorous walking.      NUTRITION RESOURCES:  IFM Elimination Diet - Recipes, guide, meal plan    PATIENT'S BEHAVIOR CHANGE GOALS:   See nutrition  intervention for patient stated behavior change goals. AVS was printed and given to patient at today's appointment.    MONITOR / EVALUATE:  Registered Dietitian will monitor/evaluate the following:     Beliefs and attitudes related to food    Food and nutrition knowledge / skills    Food / Beverage / Nutrient intake     Pertinent Labs    Progress toward meeting stated nutrition-related goals    Readiness to change nutrition-related behaviors    Weight change    Digestion     COORDINATION OF CARE:  Recommend additional testing B12, zinc and mg would be beneficial after seeing how he does with dietary changes       FOLLOW-UP:  Follow-up appointment scheduled in August virtual.       Time spent in minutes: 75 minutes   Encounter: Individual    Deanna Hodgson RD, CLT, LD  Integrative Registered Dietitian

## 2020-07-01 NOTE — PATIENT INSTRUCTIONS
"NUTRITION INTERVENTION:      Long Term Goals:   Goal: BM Daily [place] Stool type 4 and decrease digestive issues    Goal: Gain 10lbs in the next 1-3 months and stop losing weight     Short Term Goals:   Goal 1:   Start smoothie for breakfast daily - see recipes - try unsweetened almond milk instead of cows milk, pick plant based protein powder as discussed below        Goal 2:  Try the probiotic BIOHM - see more info under probiotics below     Goal 3: At follow up will consider a digestive enzyme if dietary and probiotic have not significantly helped with symptoms.       Nutrition for Dumping Syndrome  Dumping syndrome is a condition that occurs when food is emptied or \"dumped\" from  the stomach into the small intestine too quickly.     There are two phases for symptoms of dumping syndrome: early dumping and late  dumping:    Early dumping may occur 10-30 minutes right after a meal, symptoms may  include nausea, vomiting, diarrhea, gas, bloating and cramping.    Late dumping may occur 1-3 hours after a meal, symptoms may include  hypoglycemia (low blood sugars), dizziness, lightheadedness and sweating.  Making changes in the diet can help slow gastric emptying and minimize symptoms.  General Guidelines    Eat 5-6 small frequent meals throughout the day. Eat slowly and chew food well.    People with dumping syndrome should reduce the amount of food consumed at each meal in order to minimize symptoms. That means eating about 5 to 6 smaller meals throughout the day instead of eating three larger meals. In addition to eating smaller meals, dumping syndrome patients should eat slowly, chew their food well and delay drinking fluids until at least 30 minutes after a meal. Even though you should wait until after a meal is finished to drink water, make sure you do it so that you don t become dehydrated. Some people also find it helpful to lie down after eating, which may help to relieve symptoms.      Avoid drinking liquids " with meals. Drink liquids 30 minutes before a meal or drink  30 minutes after a meal.      Include small amounts of extra fats into the diet such as avocado, nuts and nut  butters.    Include protein with each meal and snack you eat. (High protein foods include  meats, poultry, fish, eggs, tofu and nut butters- monitor to see if sensitive to trigger protein foods taken out of elimination diet such as eggs, beef, pork and dairy)    Rest or lie down for 30 minutes after a meal       Avoid inflammatory & high sugar foods. Dairy tops the list. The proteins like casein and whey in dairy can irritate and inflame your gut, while gluten the protein in wheat, rye and barley is a close second. Give those foods up for at least 3-6 months and see if digestion, blood sugars, weight and overall health improve.    The lactose in dairy products may worsen dumping syndrome symptoms, so limiting dairy consumption may be helpful. If you eat dairy products, start by limiting these foods to see if symptoms improve. If you think that dairy may be causing symptoms to worsen, eliminate dairy foods completely. For people with lactose intolerance, goat milk may be easier to digest because it contains lower concentrations of lactose.    Continue to eliminate gluten, dairy, soy, corn, processed refined grains, sugar, alcohol and caffeine  Monitor FODMAPS and if nightshades contribute to your symptoms. Top common nightshades are white potatoes, tomatoes, egg plant, bell peppers and hot peppers. Also, monitor foods high in histamine and fermented foods to see if trigger symptoms - see Elimination Food Plan     Eat real anti-inflammatory food. When you eat whole, real foods in their unprocessed forms, you take the first step to healing our gut and overall health. Eat plenty of vegetables, fruits, non-gluten whole grains (monitor for symptoms), nuts, seeds, and other plant foods.     Start following reintroduction phase (see guide for details) of  elimination diet to see if certain foods trigger symptoms. If you are avoiding FODMAPS focus on keeping out wheat and gluten and then add in cashew butter or nuts to see if this high fodmap food gives you issues. The same can be done for nightshades. Tracking your foods and symptoms can be beneficial in helping you identify patterns while becoming more aware of what you eat.       Food plan - 1,800-2,200 calories per day     Protein 10-12 servings per day - include at each meal to stabilize blood sugars   (Choose 3oz or 21g per meal and aim for 1oz of 7g for snacks)   Strive for 1-2 servings of fish per week especially of higher omega-3 fatty acid containing fish such as salmon.     Legumes <2 serving per day     Dairy alternatives 2-3 serving per day     Nuts and seeds 3-4 servings per day - great to incorporate as snacks    Fats and oils 4 servings per day     Non starchy vegetables 8-10 servings per day     Starchy vegetable limit 1 serving per day as they tend to impact blood sugar (they are moderate-GI).     Fruits 2 servings per day - best to couple with a little bit of protein or fat to offset a rise in blood sugars (they are low-moderate-GI foods).     Whole grains <2 serving per day - try gluten free whole grains instead    Eat High-Fiber and Protein-Rich Foods    Eating complex carbohydrates that are high in fiber can help people with dumping syndrome. This is because they resist immediate breakdown and convert into sugar over time, as opposed to refined carbohydrates that have had their structure altered and enter the bloodstream like an injection of sugar. Complex carbohydrate foods that you can be consume regularly include root vegetables (like sweet potatoes, carrots, parsnips and beets), legumes, sprouted grain bread, and ancient grains like barley and quinoa. Try focusing on gluten free grains for at least 3 weeks to see if this helps with symptoms.     Eating protein foods is also important because  they require more work for the body to digest than fast-acting refined carbohydrates. Eating clean, healthy sources of protein will also help to stabilize your blood sugar levels. They also provide helpful digestive enzymes and immune-boosting antibodies. The best sources of protein are grass-fed beef, lentils, wild fish, organic chicken, free-range eggs and protein powder made from bone broth.    Incorporate protein powder daily:    Plant based hemp (recommended brands: [place], Nutiva, Just Hemp Protein, [employer])      Plant based pea (recommended brands: Naked Pea, [employer] Sports). If you want to try a combo of pea and hemp the brand [employer] in vanilla or chocolate is a great option.     Try Bone broth protein powder or collagen peptides in liquid bone broth, vegetable broth or 12 oz of water as snack. The bone broth powder and collagen can be used for soups as well. This can help provide essential amino acids and minerals that heal your gut as well as balance blood sugars. A great option if you have a hard time tolerating solids.         Eat Fresh Fruits and Vegetables    Eat plenty of fresh fruits and vegetables in order to ensure that you re consuming (and absorbing) enough nutrients throughout the day. Colorful fruits like apples, pears, megan, guava, pineapple and berries, and cruciferous vegetables (like arugula, bok cady, broccoli, kale and kristi greens) are some of the best choices. If you choose to make a healthy smoothie with fruits and vegetables instead of eating them whole with your meals, wait an hour after eating. This allows your body to properly digest the solid foods first.      Choose foods high in fiber: Aim for at least 5 grams of fiber per serving of food or a total of 25-35 grams fiber per day. Remember, when looking at the label, you can take the fiber away from the total carbs. Ex:15g of total carbs - 4g of fiber = 11g net carbs     Insoluble fiber acts like a bulky  inner  broom,  sweeping out debris from the intestine and creating more motility and movement.      Soluble fiber attracts water and swells, creating a gel that slows digestion.  Also, slows the release of glucose from foods into the blood which stops spikes in blood sugar levels.  Soluble fiber traps toxins in the gut, helping to carry them to excretion and provides healthy bacteria in the digestive tract.       Spread out fiber intake throughout the day. (Fiber is a component of complex carbohydrates that the body cannot digest, is found in plant based foods such as beans, lentils,  fruits, vegetables and whole grains)      Limit Simple Carbohydrates    It s important for people with dumping syndrome to eliminate simple, rapidly absorbable carbohydrates from their diet in order to prevent late dumping symptoms like hypoglycemia. Refined carbohydrates are forms of sugars and starches that don t actually exist in nature. This means they don t come from natural, whole foods. Instead they are processed, concentrated and purified in order to change the food s taste, texture and shelf life.    Avoid eating baked goods, refined grains, wheat products, fruit juices, soda and foods made with table sugar. It s also important to avoid eating packaged and processed foods that contain simple carbohydrates, added sugars and chemical additives that can disrupt the GI tract even further and make dumping symptoms worse.    Avoid foods containing refined sugars, artificial sweeteners, and refined grains they are considered high-GI because they lead to sharp increases in blood sugars levels, which increase insulin sensitivity causing increased TG, and low good cholesterol (HDL).   Ex: cakes, cookies, pies, bread, sodas, fruit drinks, presweetened tea, coffee drinks, energy or sport drinks, flavored milk and other processed foods.     Choose High Quality Fats: Adding anti-inflammatory fats into your diet such as fish (salmon, herring,  mackerel, and sardines), omega 3 eggs, leidy seeds, ground flax seeds/milk, hemp seeds/milk, walnuts and some other certain leafy greens will increase omega-3 fats to omeaga-6 fats ratio.     Therapeutic fats both monounsaturated and polyunsaturated to include daily: ground flaxseeds, unsalted mixed nuts, avocados, olives, extra-virgin olive oil.     Emphasize high-quality oils and fats in the diet daily such as avocado oil, coconut oil, flaxseed, olive, sesame. Ex: 1 tsp to 1 tbsp of MCT oil from coconuts can be added into coffee, smoothies, and salad dressings per day.     Avoid trans fats found in processed foods     Drink more water. Hydration is critical, so drink at least six to eight glasses of water a day. Drink more water between meals and less at meals.     Try adding herbal teas (sugar free) or lemon/lime/cucumber/fruit to water for flavor. Avoid artificial sweetener packets to flavor your water.     Cut back on coffee switch to green tea. Avoid adding sugar and milk to coffee instead use dairy alternatives such as almond, flax, coconut milk.     REPOPULATE  Recolonization with healthy, beneficial bacteria (probiotics) and reintroduce prebiotics from foods/supplements as tolerated that will help rebalance the microbiome.     Probiotics: Consume foods rich in beneficial bacteria such as coconut kefir, coconut yogurt, sauerkraut, kimchi, kombucha or [employer].       Prebiotics: Incorporate carbohydrates that bacteria love to eat such as inulin from chicory, onions, garlic, leeks, artichokes, dandelion leaves, zack gum, asparagus, apples, leidy/flax seeds, psyllium, beans and resistant starches (seeds, cashews, legumes, plantains, green bananas and potatoes that are cooked and cooled.    **avoid any foods that cause adverse reactions and not tolerated when you reintroduce. Introduce slowly small amounts of gas and bloating is normal and should pass.       Rebuild your friendly bacteria in your microbime. Start  taking probiotic supplements. They will help rebuild the healthy bacteria so essential to good gut health.     Recommend trying BIOHM probiotic.  Take 1 capsule daily anytime with our without food. Does not need to be refrigerated. Get at www.[employer] Try take at least for one month. This isn t something you have to take long term for years and years. Just getting some good bacteria in and then eating the right foods feeds them so they can multiply.     Track what you eat. Writing down or tracking through an ke what you eat as well as how you feel and help you identify patterns in your symptoms. This can help you become more aware and create a diet that is right for you. You can track symptoms, bowl movements, medications, stress, exercise, sleep and foods as well as beverages to become more mindful.     ** Make list of foods that cause symptoms to provide and review at follow up.     Increase physical activity. Moving our body helps move our bowels and speeds up your metabolism.     Exercise 15-60 minutes daily--whether that looks like burst training, yoga, or vigorous walking.      NUTRITION RESOURCES:  [facility] Elimination Diet - Recipes, guide, meal plan

## 2020-07-24 ENCOUNTER — TELEPHONE (OUTPATIENT)
Dept: GASTROENTEROLOGY | Facility: CLINIC | Age: 75
End: 2020-07-24

## 2020-07-24 NOTE — TELEPHONE ENCOUNTER
7/24 Provided phone number [phone] to schedule in about 3 months (around 9/19/2020).    Gena Hugo   Procedure    Ortho/Sports Med/Pod/Ent/Eye/Surgical Specialties  [facility] [place]   [phone]

## 2020-09-18 ENCOUNTER — VIRTUAL VISIT (OUTPATIENT)
Dept: GASTROENTEROLOGY | Facility: CLINIC | Age: 75
End: 2020-09-18
Payer: MEDICARE

## 2020-09-18 DIAGNOSIS — K91.1 POSTSURGICAL DUMPING SYNDROME: Primary | ICD-10-CM

## 2020-09-18 PROCEDURE — 99443 ZZC PHYSICIAN TELEPHONE EVALUATION 21-30 MIN: CPT | Performed by: INTERNAL MEDICINE

## 2020-09-18 NOTE — PROGRESS NOTES
"     HPI:    Arik presents today for a telephone visit for follow-up of dumping syndrome.  Has been doing a little better but continues to have issues eating and then trying to work - especially if it involves a lot of bending.  Says it makes him sneeze.  Did see the nutritionist and says he can't do what she wants him to do. Tried smoothies but didn't like them.  Doesn't like the food choices that were discussed - said he is \"74 years old and can't start eating vegetables.\"  Has quit drinking soft drinks and cut back on coffee. Has cut back on the amount of meat he eats and cut back on fried/greasy foods.  Continues to eat a lot of candy and says it doesn't bother him.     Overall is not inhibited in his lifestyle by his symptoms at present.  Only thing he can't do is wear a belt.      Took prevacid for 30 days - doesn't think it helped but states his heartburn went away on its own. Now only needs to use tums as needed.     Social History     Tobacco Use     Smoking status: Never Smoker     Smokeless tobacco: Never Used   Substance Use Topics     Alcohol use: Not on file        Assessment and Plan:    # dumping syndrome - overall doing better.  Discussed diet and lifestyle changes - patient admits that they are difficult to follow and he understands that he may do better if he follows them more closely but that he feels fine how he is now and is generally happy with his symptoms.  Did discuss allowing time after eating before going out to work again, especially in the summer.  General diet recommendations reviewed as well.    # reflux - resolved      RTC PRN    Marichuy Hutchison DO     Phone call duration: 21 minutes  "

## 2020-09-18 NOTE — PATIENT INSTRUCTIONS
Continue to pay attention to your diet and foods that work better for you.  Allow extra time in the summer to rest after eating before going back to work.    Let us know if your symptoms are getting worse or you have further questions or concerns.  Follow-up with me as needed.

## 2020-09-18 NOTE — PROGRESS NOTES
"Luciano Mcdonnell is a 74 year old male who is being evaluated via a billable telephone visit.      The patient has been notified of following:     \"This telephone visit will be conducted via a call between you and your physician/provider. We have found that certain health care needs can be provided without the need for a physical exam.  This service lets us provide the care you need with a short phone conversation.  If a prescription is necessary we can send it directly to your pharmacy.  If lab work is needed we can place an order for that and you can then stop by our lab to have the test done at a later time.    Telephone visits are billed at different rates depending on your insurance coverage. During this emergency period, for some insurers they may be billed the same as an in-person visit.  Please reach out to your insurance provider with any questions.    If during the course of the call the physician/provider feels a telephone visit is not appropriate, you will not be charged for this service.\"    Patient has given verbal consent for Telephone visit?  Yes    What phone number would you like to be contacted at? [phone]    How would you like to obtain your AVS? Mail a copy    Phone call duration:  minutes    Murtaza Downey MA      "